# Patient Record
Sex: FEMALE
[De-identification: names, ages, dates, MRNs, and addresses within clinical notes are randomized per-mention and may not be internally consistent; named-entity substitution may affect disease eponyms.]

---

## 2017-03-18 ENCOUNTER — HOSPITAL ENCOUNTER (EMERGENCY)
Dept: HOSPITAL 31 - C.ER | Age: 42
Discharge: HOME | End: 2017-03-18
Payer: COMMERCIAL

## 2017-03-18 VITALS
SYSTOLIC BLOOD PRESSURE: 106 MMHG | HEART RATE: 88 BPM | DIASTOLIC BLOOD PRESSURE: 67 MMHG | OXYGEN SATURATION: 98 % | RESPIRATION RATE: 20 BRPM | TEMPERATURE: 98.1 F

## 2017-03-18 VITALS — BODY MASS INDEX: 19.2 KG/M2

## 2017-03-18 DIAGNOSIS — R51: Primary | ICD-10-CM

## 2017-03-18 LAB
ALBUMIN/GLOB SERPL: 1.4 {RATIO} (ref 1–2.1)
ALP SERPL-CCNC: 52 U/L (ref 38–126)
ALT SERPL-CCNC: 39 U/L (ref 9–52)
AST SERPL-CCNC: 38 U/L (ref 14–36)
BACTERIA #/AREA URNS HPF: (no result) /[HPF]
BASOPHILS # BLD AUTO: 0 K/UL (ref 0–0.2)
BASOPHILS NFR BLD: 0.6 % (ref 0–2)
BILIRUB SERPL-MCNC: 0.4 MG/DL (ref 0.2–1.3)
BILIRUB UR-MCNC: NEGATIVE MG/DL
BUN SERPL-MCNC: 13 MG/DL (ref 7–17)
CALCIUM SERPL-MCNC: 9 MG/DL (ref 8.6–10.4)
CHLORIDE SERPL-SCNC: 99 MMOL/L (ref 98–107)
CO2 SERPL-SCNC: 24 MMOL/L (ref 22–30)
EOSINOPHIL # BLD AUTO: 0 K/UL (ref 0–0.7)
EOSINOPHIL NFR BLD: 0.2 % (ref 0–4)
ERYTHROCYTE [DISTWIDTH] IN BLOOD BY AUTOMATED COUNT: 12.6 % (ref 11.5–14.5)
GLOBULIN SER-MCNC: 3.1 GM/DL (ref 2.2–3.9)
GLUCOSE SERPL-MCNC: 105 MG/DL (ref 65–105)
GLUCOSE UR STRIP-MCNC: NORMAL MG/DL
HCT VFR BLD CALC: 38.7 % (ref 34–47)
KETONES UR STRIP-MCNC: (no result) MG/DL
LEUKOCYTE ESTERASE UR-ACNC: (no result) LEU/UL
LYMPHOCYTES # BLD AUTO: 0.5 K/UL (ref 1–4.3)
LYMPHOCYTES NFR BLD AUTO: 9.8 % (ref 20–40)
MCH RBC QN AUTO: 32.2 PG (ref 27–31)
MCHC RBC AUTO-ENTMCNC: 34.6 G/DL (ref 33–37)
MCV RBC AUTO: 93 FL (ref 81–99)
MONOCYTES # BLD: 0.2 K/UL (ref 0–0.8)
MONOCYTES NFR BLD: 2.8 % (ref 0–10)
NEUTROPHILS NFR BLD AUTO: 90 % (ref 50–75)
NRBC BLD AUTO-RTO: 0 % (ref 0–2)
PH UR STRIP: 7 [PH] (ref 5–8)
PLATELET # BLD: 218 K/UL (ref 130–400)
PMV BLD AUTO: 7 FL (ref 7.2–11.7)
POTASSIUM SERPL-SCNC: 4.2 MMOL/L (ref 3.6–5.2)
PROT SERPL-MCNC: 7.6 G/DL (ref 6.3–8.3)
PROT UR STRIP-MCNC: NEGATIVE MG/DL
RBC # UR STRIP: NEGATIVE /UL
RBC #/AREA URNS HPF: 9 /HPF (ref 0–3)
SODIUM SERPL-SCNC: 139 MMOL/L (ref 132–148)
SP GR UR STRIP: 1.02 (ref 1–1.03)
TOTAL CELLS COUNTED BLD: 100
UROBILINOGEN UR-MCNC: 0.2 MG/DL (ref 0.2–1)
WBC # BLD AUTO: 5.6 K/UL (ref 4.8–10.8)
WBC #/AREA URNS HPF: 1 /HPF (ref 0–5)

## 2017-03-18 NOTE — C.PDOC
History Of Present Illness


Patient is a 40 y/o female that presents to the ED for evaluation of headache 

associated with nausea, and photophobia since last night. Patient states that 

her symptoms are similar to her typical migraine headache. Patient states her 

migraine is usually relieved by Motrin 400mg PO but is unable to tolerate PO 

today. Otherwise, patient denies any dizziness, weakness, numbness, fever, 

chills, or any other associated symptoms at this time.





Time Seen by Provider: 17 14:51


Chief Complaint (Nursing): Abdominal Pain


History Per: Patient


History/Exam Limitations: no limitations


Onset/Duration Of Symptoms: Days (1)


Current Symptoms Are (Timing): Still Present


Quality Of Discomfort: Aching


Associated Symptoms: Nausea.  denies: Fever, Chills


Recent travel outside of the [place]: No


Additional History Per: Patient





Past Medical History


Reviewed: Historical Data, Nursing Documentation, Vital Signs


Vital Signs: 


 Last Vital Signs











Temp  98.1 F   17 16:51


 


Pulse  88   17 16:51


 


Resp  20   17 16:51


 


BP  106/67   17 16:51


 


Pulse Ox  98   17 16:53











Surgical History: Tonsillectomy, 





- [facility] Procedures








D & C POST DELIVERY (05/01/15)


LOW CERVICAL  (13)








Family History: States: Unknown Family Hx





- Social History


Hx Alcohol Use: No


Hx Substance Use: No





- Immunization History


Hx Tetanus Toxoid Vaccination: No


Hx Influenza Vaccination: Yes


Hx Pneumococcal Vaccination: No





Review Of Systems


Except As Marked, All Systems Reviewed And Found Negative.


Constitutional: Negative for: Fever, Chills


Eyes: Positive for: Other (photophobia)


Cardiovascular: Negative for: Chest Pain, Palpitations


Respiratory: Negative for: Cough, Shortness of Breath


Gastrointestinal: Positive for: Nausea.  Negative for: Abdominal Pain


Musculoskeletal: Negative for: Neck Pain, Back Pain


Neurological: Positive for: Headache.  Negative for: Weakness, Numbness, 

Dizziness





Physical Exam





- Physical Exam


Appears: Non-toxic, No Acute Distress


Skin: Normal Color, Warm, Dry


Head: Atraumatic, Normacephalic


Eye(s): bilateral: Other (miosis pupils)


Neck: Normal ROM, Supple


Chest: Symmetrical


Cardiovascular: Rhythm Regular


Respiratory: Normal Breath Sounds, No Rales, No Rhonchi, No Wheezing


Extremity: Normal ROM, No Deformity


Extremity: Bilateral: Atraumatic


Neurological/Psych: Oriented x3, Normal Speech, Normal Cognition, Other (neuro 

intact)





ED Course And Treatment





- Laboratory Results


Result Diagrams: 


 17 15:18





 17 15:18


Lab Interpretation: Normal (UDS neg, UA neg.)


Urine Pregnancy POC: Negative


O2 Sat by Pulse Oximetry: 98 (on RA)


Pulse Ox Interpretation: Normal


Progress Note: Labs, EKG ordered and reviewed. Patient was treated with IV 

fluids, Toradol IVP, and Zofran inj. in the ER.  On reassessment, patient 

reports improvement of headache. No neurological deficits.


Reevaluation Time: 16:36


Reassessment Condition: Improved





Medical Decision Making


Medical Decision Making: 


typical headache, ? migraine type.








Disposition


Doctor Will See Patient In The: Office


Counseled Patient/Family Regarding: Studies Performed, Diagnosis





- Disposition


Referrals: 


Roxanna Delgado MD [Staff Provider] - 


Disposition: HOME/ ROUTINE


Disposition Time: 16:36


Condition: GOOD


Prescriptions: 


Ondansetron ODT [Zofran ODT] 1 odt PO BID PRN #6 odt


 PRN Reason: Nausea/Vomiting


Instructions:  Acute Headache (ED)





- Clinical Impression


Clinical Impression: 


 Headache





- Scribe Statement


The provider has reviewed the documentation as recorded by the Oanh Shea


Provider Attestation: 





All medical record entries made by the Altheaibhiram were at my direction and 

personally dictated by me. I have reviewed the chart and agree that the record 

accurately reflects my personal performance of the history, physical exam, 

medical decision making, and the department course for this patient. I have 

also personally directed, reviewed, and agree with the discharge instructions 

and disposition.

## 2018-12-19 ENCOUNTER — HOSPITAL ENCOUNTER (EMERGENCY)
Dept: HOSPITAL 31 - C.ER | Age: 43
Discharge: HOME | End: 2018-12-19
Payer: COMMERCIAL

## 2018-12-19 VITALS
TEMPERATURE: 98.3 F | OXYGEN SATURATION: 96 % | HEART RATE: 73 BPM | SYSTOLIC BLOOD PRESSURE: 110 MMHG | DIASTOLIC BLOOD PRESSURE: 75 MMHG | RESPIRATION RATE: 18 BRPM

## 2018-12-19 VITALS — BODY MASS INDEX: 19.2 KG/M2

## 2018-12-19 DIAGNOSIS — T18.9XXA: Primary | ICD-10-CM
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
## 2018-12-19 NOTE — C.PDOC
History Of Present Illness


43 year old female presents to the ER stating she swallowed a plastic denture. 

Denies difficulty speaking or difficulty swallowing.


Chief Complaint (Nursing): Foreign Body


History Per: Patient


History/Exam Limitations: no limitations


Onset/Duration Of Symptoms: Hrs


Current Symptoms Are (Timing): Still Present


Recent travel outside of the [place]: No





Past Medical History


Reviewed: Historical Data, Nursing Documentation, Vital Signs


Vital Signs: 





                                Last Vital Signs











Temp  98 F   18 21:00


 


Pulse  99 H  18 21:00


 


Resp  16   18 21:00


 


BP  128/80   18 21:00


 


Pulse Ox  100   18 21:00











Surgical History: Tonsillectomy, 





- [facility] Procedures











D & C POST DELIVERY (05/01/15)


LOW CERVICAL  (13)








Family History: States: Unknown Family Hx





- Social History


Hx Alcohol Use: No


Hx Substance Use: No





- Immunization History


Hx Tetanus Toxoid Vaccination: No


Hx Influenza Vaccination: No


Hx Pneumococcal Vaccination: No





Review Of Systems


Constitutional: Negative for: Fever, Chills


ENT: Positive for: Other (Swallowed foreign body)


Cardiovascular: Negative for: Chest Pain, Palpitations


Respiratory: Negative for: Cough, Shortness of Breath


Gastrointestinal: Negative for: Nausea, Vomiting


Neurological: Negative for: Weakness, Numbness





Physical Exam





- Physical Exam


Appears: Non-toxic, Other (Able to talk without difficulty)


Skin: Normal Color, Warm, Dry


Head: Atraumatic, Normacephalic


Eye(s): bilateral: Normal Inspection


Oral Mucosa: Moist, Other (No foreign body or abrasions visualized.)


Throat: Normal, Other (Able to swallow without)


Neck: Normal, Supple


Chest: Symmetrical, No Tenderness


Cardiovascular: Rhythm Regular


Respiratory: Normal Breath Sounds, No Rales, No Rhonchi, No Wheezing


Gastrointestinal/Abdominal: Soft, No Tenderness


Neurological/Psych: Oriented x3, Normal Speech





ED Course And Treatment


O2 Sat by Pulse Oximetry: 100 (Room air)


Pulse Ox Interpretation: Normal


Progress Note: CXR ordered.





Disposition


Counseled Patient/Family Regarding: Diagnosis





- Disposition


Referrals: 


[facility] [Outside]


Disposition: HOME/ ROUTINE


Disposition Time: 23:10


Condition: STABLE


Instructions:  Foreign Body, Swallowed, Adult


Forms:  Differential (English)





- POA


Present On Arrival: None





- Clinical Impression


Clinical Impression: 


 H/O swallowed foreign body








- Scribe Statement


The provider has reviewed the documentation as recorded by the Scribe





Aquiles Monreal





All medical record entries made by the Scribe were at my direction and 

personally dictated by me. I have reviewed the chart and agree that the record 

accurately reflects my personal performance of the history, physical exam, 

medical decision making, and the department course for this patient. I have also

 personally directed, reviewed, and agree with the discharge instructions and 

disposition.

## 2018-12-20 NOTE — RAD
Date of service: 



12/19/2018



HISTORY:

 foriegn body 



COMPARISON:

No prior.



TECHNIQUE:

Chest PA and lateral



FINDINGS:



LUNGS:

No active pulmonary disease.



PLEURA:

No significant pleural effusion identified. No pneumothorax apparent.



CARDIOVASCULAR:

No aortic atherosclerotic calcification present.



Normal cardiac size. No pulmonary vascular congestion. 



OSSEOUS STRUCTURES:

No significant abnormalities.



VISUALIZED UPPER ABDOMEN:

Normal.



OTHER FINDINGS:

None.



IMPRESSION:

No active disease.

## 2018-12-20 NOTE — CT
Date of service: 



12/19/2018



PROCEDURE:  CT Chest, Abdomen and Pelvis without intravenous contrast



HISTORY:

FOREIGN BODY IN THROAT. 



COMPARISON:

No prior study available for comparison however correlation made with 

concurrent chest radiograph 



TECHNIQUE:

Contiguous helical/transaxial sections of the chest abdomen pelvis 

performed without oral or intravenous contrast material.  Additional 

2D sagittal and coronal reformats provided. 



Radiation dose:



Total exam DLP = 436.88 mGy-cm.



This CT exam was performed using one or more of the following dose 

reduction techniques: Automated exposure control, adjustment of the 

mA and/or kV according to patient size, and/or use of iterative 

reconstruction technique.



FINDINGS:



CT CHEST WITHOUT CONTRAST:



LUNGS:

No acute consolidation.  No mass or nodule the.



MEDIASTINUM:

Heart size within range of normal.  No significant pericardial 

effusion.  Ascending thoracic aorta measures approximately 2.7 cm.  

Descending thoracic aorta measures approximately 1.9 cm.  No 

significant atherosclerotic plaque changes. 



Pulmonary trunk measures approximately 2.0 cm. 



Few small nonspecific mediastinal lymph nodes are present right.  

Evaluation for hilar adenopathy is limited due to the lack of 

circulating intravenous contrast material. 



Iliac trachea is midline and patent with no large central endoluminal 

lesions.  



There is a small hiatal hernia with slight wall thickening of the 

distal esophagus likely due to protrusion gastric mucosa. 



LYMPH NODES:

As above.  Evaluation for hilar adenopathy is limited due to the lack 

of circulating intravenous contrast material.



There are a few small bilateral nonspecific axillary lymph nodes



PLEURA:

Unremarkable. No pneumothorax. No pleural fluid.



BONES:

Osseous structures appear intact.  Minimal degenerative spondylosis 

seen at few thoracic levels..  No acute compression fracture the 

retropulsed fragments. 



OTHER FINDINGS:

Apart from of in situ bilateral breast prostheses, no radiopaque 

foreign bodies are identified...



CT ABDOMEN AND PELVIS:



LIVER:

The unenhanced liver exhibits normal size.  No evidence of hepatic 

masses or collections seen on this noncontrast exam. 



GALLBLADDER AND BILE DUCTS:

Gallbladder is incompletely distended however no evidence of 

intraluminal gallbladder calculi. 



PANCREAS:

The unenhanced pancreas appears grossly unremarkable so far as can be 

seen.



SPLEEN:

The spleen exhibits normal size and attenuation pattern without mass 

collection or calcification. 



ADRENALS:

There are no adrenal lesions.. 



KIDNEYS AND URETERS:

Kidneys demonstrate relatively symmetric size.  No evidence of 

nephrolithiasis or hydronephrosis.. 



VASCULATURE:

No significant the aortic atherosclerotic calcification or mural 

plaque present.



Unremarkable. No aortic aneurysm. 



BOWEL:

Evaluation of the bowel is somewhat limited due to the lack of oral 

contrast material..  



APPENDIX:

Appendix is not seen with complete certainty on this study however no 

obvious inflammatory changes right lower quadrant of the abdomen.. 



PERITONEUM:

Unremarkable. No free fluid. No free air.  There is a small fat 

containing umbilical hernia 



LYMPH NODES:

Unremarkable. No enlarged lymph nodes. 



BLADDER:

Unremarkable. 



REPRODUCTIVE:

The uterus is enlarged- bulky and lobulated in appearance consistent 

with uterine fibroids..  Several discrete partially exophytic 

fibroids, a few of which exhibit calcifications are also noted. 



There is a left-sided adnexal stent measured measures approximately 

2.6 x 2.4 cm.  



BONES:

Normal stature lumbar vertebral bodies intact.  Some minimal 

broad-based disc bulging changes seen at the L4-L5 and L5-S1 and 

probably to a lesser degree L3-L4 levels. 



OTHER FINDINGS:

None.



IMPRESSION:

No radiopaque foreign bodies apart from in situ bilateral breast 

implants.



Enlarged myomatous uterus.  Left adnexal cyst.

## 2018-12-21 ENCOUNTER — HOSPITAL ENCOUNTER (OUTPATIENT)
Dept: HOSPITAL 31 - C.ENDO | Age: 43
Discharge: HOME | End: 2018-12-21
Attending: INTERNAL MEDICINE
Payer: COMMERCIAL

## 2018-12-21 VITALS — SYSTOLIC BLOOD PRESSURE: 104 MMHG | HEART RATE: 69 BPM | RESPIRATION RATE: 12 BRPM | DIASTOLIC BLOOD PRESSURE: 67 MMHG

## 2018-12-21 VITALS — TEMPERATURE: 97.5 F

## 2018-12-21 VITALS — OXYGEN SATURATION: 100 %

## 2018-12-21 VITALS — BODY MASS INDEX: 19.2 KG/M2

## 2018-12-21 DIAGNOSIS — R13.10: Primary | ICD-10-CM

## 2018-12-21 DIAGNOSIS — K29.70: ICD-10-CM

## 2018-12-21 PROCEDURE — 88305 TISSUE EXAM BY PATHOLOGIST: CPT

## 2018-12-21 PROCEDURE — 84703 CHORIONIC GONADOTROPIN ASSAY: CPT

## 2018-12-21 PROCEDURE — 43239 EGD BIOPSY SINGLE/MULTIPLE: CPT
